# Patient Record
Sex: MALE | Race: OTHER | Employment: FULL TIME | URBAN - METROPOLITAN AREA
[De-identification: names, ages, dates, MRNs, and addresses within clinical notes are randomized per-mention and may not be internally consistent; named-entity substitution may affect disease eponyms.]

---

## 2017-03-17 RX ORDER — POTASSIUM CITRATE 10 MEQ/1
10 TABLET, EXTENDED RELEASE ORAL
Qty: 270 TABLET | Refills: 3 | OUTPATIENT
Start: 2017-03-17

## 2017-03-17 RX ORDER — POTASSIUM CITRATE 10 MEQ/1
TABLET, EXTENDED RELEASE ORAL
Qty: 3240 TABLET | Refills: 3 | Status: CANCELLED | OUTPATIENT
Start: 2017-03-17

## 2017-03-17 RX ORDER — POTASSIUM CITRATE 10 MEQ/1
10 TABLET, EXTENDED RELEASE ORAL
Qty: 270 TABLET | Refills: 3 | Status: SHIPPED | OUTPATIENT
Start: 2017-03-17 | End: 2017-11-07 | Stop reason: CLARIF

## 2017-03-17 RX ORDER — ALLOPURINOL 300 MG/1
300 TABLET ORAL DAILY
Qty: 90 TABLET | Refills: 3 | Status: SHIPPED | OUTPATIENT
Start: 2017-03-17 | End: 2020-12-15

## 2017-03-17 RX ORDER — ALLOPURINOL 300 MG/1
300 TABLET ORAL DAILY
Qty: 90 TABLET | Refills: 3 | OUTPATIENT
Start: 2017-03-17

## 2017-11-01 LAB — PROSTATE SPECIFIC ANTIGEN: 1 NG/ML

## 2017-11-07 ENCOUNTER — OFFICE VISIT (OUTPATIENT)
Dept: UROLOGY | Age: 57
End: 2017-11-07

## 2017-11-07 VITALS
DIASTOLIC BLOOD PRESSURE: 84 MMHG | BODY MASS INDEX: 36.4 KG/M2 | HEART RATE: 70 BPM | WEIGHT: 260 LBS | HEIGHT: 71 IN | SYSTOLIC BLOOD PRESSURE: 138 MMHG

## 2017-11-07 DIAGNOSIS — Z87.442 HISTORY OF KIDNEY STONES: ICD-10-CM

## 2017-11-07 DIAGNOSIS — N40.1 BPH WITH OBSTRUCTION/LOWER URINARY TRACT SYMPTOMS: Primary | ICD-10-CM

## 2017-11-07 DIAGNOSIS — N13.8 BPH WITH OBSTRUCTION/LOWER URINARY TRACT SYMPTOMS: Primary | ICD-10-CM

## 2017-11-07 LAB
BILIRUBIN, POC: NORMAL
BLOOD URINE, POC: NORMAL
CLARITY, POC: CLEAR
COLOR, POC: YELLOW
GLUCOSE URINE, POC: NORMAL
KETONES, POC: NORMAL
LEUKOCYTE EST, POC: NORMAL
NITRITE, POC: NORMAL
PH, POC: 7
PROTEIN, POC: NORMAL
SPECIFIC GRAVITY, POC: 1.02
UROBILINOGEN, POC: 1

## 2017-11-07 PROCEDURE — 51798 US URINE CAPACITY MEASURE: CPT | Performed by: UROLOGY

## 2017-11-07 PROCEDURE — 81003 URINALYSIS AUTO W/O SCOPE: CPT | Performed by: UROLOGY

## 2017-11-07 PROCEDURE — G8484 FLU IMMUNIZE NO ADMIN: HCPCS | Performed by: UROLOGY

## 2017-11-07 PROCEDURE — 99213 OFFICE O/P EST LOW 20 MIN: CPT | Performed by: UROLOGY

## 2017-11-07 PROCEDURE — 3017F COLORECTAL CA SCREEN DOC REV: CPT | Performed by: UROLOGY

## 2017-11-07 PROCEDURE — 51741 ELECTRO-UROFLOWMETRY FIRST: CPT | Performed by: UROLOGY

## 2017-11-07 PROCEDURE — G8427 DOCREV CUR MEDS BY ELIG CLIN: HCPCS | Performed by: UROLOGY

## 2017-11-07 PROCEDURE — 1036F TOBACCO NON-USER: CPT | Performed by: UROLOGY

## 2017-11-07 PROCEDURE — G8417 CALC BMI ABV UP PARAM F/U: HCPCS | Performed by: UROLOGY

## 2017-11-07 RX ORDER — LOSARTAN POTASSIUM 25 MG/1
25 TABLET ORAL DAILY
COMMUNITY

## 2017-11-07 RX ORDER — AMITRIPTYLINE HYDROCHLORIDE 25 MG/1
25 TABLET, FILM COATED ORAL NIGHTLY
COMMUNITY
End: 2020-12-15

## 2017-11-07 ASSESSMENT — ENCOUNTER SYMPTOMS: SHORTNESS OF BREATH: 0

## 2017-11-07 NOTE — PROGRESS NOTES
Subjective:      Patient ID: Yue Mendieta is a 61 y.o. male. HPI This is a 62 yo male with h/o Uric Acid stones s/p ESWL in past (2010), BPH on Flomax and  back in follow-up. Since last seen on 11/3/16, he has intermittent and mild Lt flank pain related to eating meat but none recently. He has no N/V and no hematuria or dysuria. He has stable frequency with NF 2-3 and Df < 8. He has a good fos and no SE from Flomax. He continues on Urocit k 20 meq po BID and Allopurinol daily with SE reported. He has no new medical or surgical problems. I reviewed his interval PSA today. History reviewed. No pertinent past medical history. History reviewed. No pertinent surgical history. Social History     Social History    Marital status:      Spouse name: N/A    Number of children: N/A    Years of education: N/A     Social History Main Topics    Smoking status: Never Smoker    Smokeless tobacco: Never Used    Alcohol use Yes    Drug use: No    Sexual activity: Not Asked     Other Topics Concern    None     Social History Narrative    None     History reviewed. No pertinent family history. Current Outpatient Prescriptions   Medication Sig Dispense Refill    amitriptyline (ELAVIL) 25 MG tablet Take 25 mg by mouth nightly      losartan (COZAAR) 25 MG tablet Take 25 mg by mouth daily      allopurinol (ZYLOPRIM) 300 MG tablet Take 1 tablet by mouth daily 90 tablet 3    tamsulosin (FLOMAX) 0.4 MG capsule TAKE 1 CAPSULE BY MOUTH DAILY 90 capsule 3    metFORMIN (GLUCOPHAGE) 500 MG tablet       atorvastatin (LIPITOR) 10 MG tablet       potassium citrate (UROCIT-K) 10 MEQ (1080 MG) SR tablet Take 1 tablet by mouth 3 times daily 1080 tablet 3     No current facility-administered medications for this visit. Review of patient's allergies indicates no known allergies. reviewed      Review of Systems   Constitutional: Negative for fever and unexpected weight change.    Respiratory: Negative for shortness

## 2017-12-24 DIAGNOSIS — N40.1 BENIGN NON-NODULAR PROSTATIC HYPERPLASIA WITH LOWER URINARY TRACT SYMPTOMS: ICD-10-CM

## 2017-12-29 RX ORDER — TAMSULOSIN HYDROCHLORIDE 0.4 MG/1
CAPSULE ORAL
Qty: 90 CAPSULE | Refills: 3 | Status: SHIPPED | OUTPATIENT
Start: 2017-12-29 | End: 2020-12-15

## 2018-10-30 DIAGNOSIS — N40.1 BPH WITH OBSTRUCTION/LOWER URINARY TRACT SYMPTOMS: ICD-10-CM

## 2018-10-30 DIAGNOSIS — N13.8 BPH WITH OBSTRUCTION/LOWER URINARY TRACT SYMPTOMS: ICD-10-CM

## 2018-10-30 LAB — PROSTATE SPECIFIC ANTIGEN: 1.16 NG/ML (ref 0–5.4)

## 2018-11-06 ENCOUNTER — OFFICE VISIT (OUTPATIENT)
Dept: UROLOGY | Age: 58
End: 2018-11-06
Payer: COMMERCIAL

## 2018-11-06 VITALS
BODY MASS INDEX: 36.12 KG/M2 | WEIGHT: 258 LBS | SYSTOLIC BLOOD PRESSURE: 138 MMHG | DIASTOLIC BLOOD PRESSURE: 82 MMHG | HEIGHT: 71 IN | HEART RATE: 83 BPM

## 2018-11-06 DIAGNOSIS — N13.8 BPH WITH OBSTRUCTION/LOWER URINARY TRACT SYMPTOMS: Primary | ICD-10-CM

## 2018-11-06 DIAGNOSIS — Z87.442 HISTORY OF KIDNEY STONES: ICD-10-CM

## 2018-11-06 DIAGNOSIS — N40.1 BPH WITH OBSTRUCTION/LOWER URINARY TRACT SYMPTOMS: Primary | ICD-10-CM

## 2018-11-06 PROCEDURE — 99213 OFFICE O/P EST LOW 20 MIN: CPT | Performed by: UROLOGY

## 2018-11-06 PROCEDURE — 51798 US URINE CAPACITY MEASURE: CPT | Performed by: UROLOGY

## 2018-11-06 ASSESSMENT — ENCOUNTER SYMPTOMS: SHORTNESS OF BREATH: 0

## 2019-06-24 ENCOUNTER — TELEPHONE (OUTPATIENT)
Dept: UROLOGY | Age: 59
End: 2019-06-24

## 2019-06-24 NOTE — TELEPHONE ENCOUNTER
Pt called c/o pain left side started few days ago. No other symptoms has history of stones. Asked for appt.

## 2019-06-25 ENCOUNTER — OFFICE VISIT (OUTPATIENT)
Dept: UROLOGY | Age: 59
End: 2019-06-25
Payer: COMMERCIAL

## 2019-06-25 VITALS
DIASTOLIC BLOOD PRESSURE: 76 MMHG | HEIGHT: 71 IN | SYSTOLIC BLOOD PRESSURE: 126 MMHG | BODY MASS INDEX: 36.82 KG/M2 | WEIGHT: 263 LBS | HEART RATE: 77 BPM

## 2019-06-25 DIAGNOSIS — M54.9 ACUTE RIGHT-SIDED BACK PAIN, UNSPECIFIED BACK LOCATION: Primary | ICD-10-CM

## 2019-06-25 DIAGNOSIS — Z87.442 HISTORY OF KIDNEY STONES: ICD-10-CM

## 2019-06-25 LAB
BILIRUBIN, POC: ABNORMAL
BLOOD URINE, POC: ABNORMAL
CLARITY, POC: CLEAR
COLOR, POC: YELLOW
GLUCOSE URINE, POC: ABNORMAL
KETONES, POC: ABNORMAL
LEUKOCYTE EST, POC: ABNORMAL
NITRITE, POC: ABNORMAL
PH, POC: 6
PROTEIN, POC: ABNORMAL
SPECIFIC GRAVITY, POC: 1.02
UROBILINOGEN, POC: 1

## 2019-06-25 PROCEDURE — 1036F TOBACCO NON-USER: CPT | Performed by: UROLOGY

## 2019-06-25 PROCEDURE — G8427 DOCREV CUR MEDS BY ELIG CLIN: HCPCS | Performed by: UROLOGY

## 2019-06-25 PROCEDURE — 81003 URINALYSIS AUTO W/O SCOPE: CPT | Performed by: UROLOGY

## 2019-06-25 PROCEDURE — 3017F COLORECTAL CA SCREEN DOC REV: CPT | Performed by: UROLOGY

## 2019-06-25 PROCEDURE — 99213 OFFICE O/P EST LOW 20 MIN: CPT | Performed by: UROLOGY

## 2019-06-25 PROCEDURE — G8417 CALC BMI ABV UP PARAM F/U: HCPCS | Performed by: UROLOGY

## 2019-06-25 ASSESSMENT — ENCOUNTER SYMPTOMS: ABDOMINAL DISTENTION: 0

## 2019-06-25 NOTE — PROGRESS NOTES
Subjective:      Patient ID: Apollo Cohen is a 60 y.o. male. HPI This is a 59 yo male with h/o Uric Acid stones s/p ESWL in past (2010), BPH on Flomax and  back in follow-up. Since last seen on 116/18, he had been doing well until the last few weeks when he has developed Lt flank and abdominal pain that worsens with activity. The pain is not colicky and not similar to prior stone pain. He has no N/V or F/C. He has no dysuria or hematuria or frequency or urgency. He does think he passed a stone 2 weeks ago. He continues on Urocit k 10 meq po BID and Allopurinol daily with SE reported. He has no new medical or surgical problems. I reviewed his interval PSA today.          History reviewed. No pertinent past medical history. Past Surgical History:   Procedure Laterality Date    COLONOSCOPY  2018     Social History     Socioeconomic History    Marital status:      Spouse name: None    Number of children: None    Years of education: None    Highest education level: None   Occupational History    None   Social Needs    Financial resource strain: None    Food insecurity:     Worry: None     Inability: None    Transportation needs:     Medical: None     Non-medical: None   Tobacco Use    Smoking status: Never Smoker    Smokeless tobacco: Never Used   Substance and Sexual Activity    Alcohol use:  Yes    Drug use: No    Sexual activity: None   Lifestyle    Physical activity:     Days per week: None     Minutes per session: None    Stress: None   Relationships    Social connections:     Talks on phone: None     Gets together: None     Attends Jehovah's witness service: None     Active member of club or organization: None     Attends meetings of clubs or organizations: None     Relationship status: None    Intimate partner violence:     Fear of current or ex partner: None     Emotionally abused: None     Physically abused: None     Forced sexual activity: None   Other Topics Concern    None   Social male with h/o Uric Acid stones s/p ESWL in past (2010), BPH on Flomax and with Lt flank/abdominal pain that is not suggestive for colic. He did recently pass a stone and I recommend a Stone Ct for full evaluation. Will call for results. There is no evidence for UTI by U/A today. He will continue with current other management. Plan:      1.  Stone CT and will call for results        Conrado Hyanes MD

## 2019-07-02 ENCOUNTER — HOSPITAL ENCOUNTER (OUTPATIENT)
Dept: CT IMAGING | Age: 59
Discharge: HOME | End: 2019-07-04
Payer: COMMERCIAL

## 2019-07-02 VITALS
DIASTOLIC BLOOD PRESSURE: 95 MMHG | BODY MASS INDEX: 37.65 KG/M2 | HEART RATE: 75 BPM | RESPIRATION RATE: 16 BRPM | WEIGHT: 263 LBS | SYSTOLIC BLOOD PRESSURE: 138 MMHG | HEIGHT: 70 IN

## 2019-07-02 DIAGNOSIS — M54.9 ACUTE RIGHT-SIDED BACK PAIN, UNSPECIFIED BACK LOCATION: ICD-10-CM

## 2019-07-02 PROCEDURE — 74176 CT ABD & PELVIS W/O CONTRAST: CPT

## 2019-07-02 ASSESSMENT — PAIN - FUNCTIONAL ASSESSMENT: PAIN_FUNCTIONAL_ASSESSMENT: 0-10

## 2019-07-08 ENCOUNTER — TELEPHONE (OUTPATIENT)
Dept: UROLOGY | Age: 59
End: 2019-07-08

## 2019-07-08 DIAGNOSIS — N20.0 KIDNEY STONE: Primary | ICD-10-CM

## 2019-07-11 ENCOUNTER — TELEPHONE (OUTPATIENT)
Dept: UROLOGY | Age: 59
End: 2019-07-11

## 2019-07-11 NOTE — TELEPHONE ENCOUNTER
The stones on recent Ct are non-obstructing and not likely the cause for his current complaints alana if pain in legs. F/U as scheduled.

## 2019-07-13 ENCOUNTER — HOSPITAL ENCOUNTER (OUTPATIENT)
Dept: GENERAL RADIOLOGY | Age: 59
Discharge: HOME | End: 2019-07-15
Payer: COMMERCIAL

## 2019-07-13 DIAGNOSIS — N20.0 KIDNEY STONE: ICD-10-CM

## 2019-07-13 LAB — URIC ACID, SERUM: 4.5 MG/DL (ref 3.4–7)

## 2019-07-13 PROCEDURE — 74018 RADEX ABDOMEN 1 VIEW: CPT

## 2019-07-16 ENCOUNTER — OFFICE VISIT (OUTPATIENT)
Dept: UROLOGY | Age: 59
End: 2019-07-16
Payer: COMMERCIAL

## 2019-07-16 VITALS
WEIGHT: 263 LBS | BODY MASS INDEX: 36.82 KG/M2 | SYSTOLIC BLOOD PRESSURE: 142 MMHG | DIASTOLIC BLOOD PRESSURE: 90 MMHG | HEIGHT: 71 IN | HEART RATE: 72 BPM

## 2019-07-16 DIAGNOSIS — N20.0 KIDNEY STONE: Primary | ICD-10-CM

## 2019-07-16 DIAGNOSIS — N20.0 KIDNEY STONE: ICD-10-CM

## 2019-07-16 PROCEDURE — G8427 DOCREV CUR MEDS BY ELIG CLIN: HCPCS | Performed by: UROLOGY

## 2019-07-16 PROCEDURE — 1036F TOBACCO NON-USER: CPT | Performed by: UROLOGY

## 2019-07-16 PROCEDURE — 99213 OFFICE O/P EST LOW 20 MIN: CPT | Performed by: UROLOGY

## 2019-07-16 PROCEDURE — 3017F COLORECTAL CA SCREEN DOC REV: CPT | Performed by: UROLOGY

## 2019-07-16 PROCEDURE — G8417 CALC BMI ABV UP PARAM F/U: HCPCS | Performed by: UROLOGY

## 2019-07-16 RX ORDER — INDOMETHACIN 25 MG/1
25 CAPSULE ORAL 2 TIMES DAILY WITH MEALS
COMMUNITY

## 2019-07-16 RX ORDER — CYCLOBENZAPRINE HCL 10 MG
10 TABLET ORAL NIGHTLY
COMMUNITY

## 2019-07-16 ASSESSMENT — ENCOUNTER SYMPTOMS: ABDOMINAL PAIN: 0

## 2019-07-16 NOTE — PROGRESS NOTES
violence:     Fear of current or ex partner: None     Emotionally abused: None     Physically abused: None     Forced sexual activity: None   Other Topics Concern    None   Social History Narrative    None     History reviewed. No pertinent family history. Current Outpatient Medications   Medication Sig Dispense Refill    cyclobenzaprine (FLEXERIL) 10 MG tablet Take 10 mg by mouth nightly      indomethacin (INDOCIN) 25 MG capsule Take 25 mg by mouth 2 times daily (with meals)      tamsulosin (FLOMAX) 0.4 MG capsule TAKE 1 CAPSULE BY MOUTH DAILY 90 capsule 3    amitriptyline (ELAVIL) 25 MG tablet Take 25 mg by mouth nightly      losartan (COZAAR) 25 MG tablet Take 25 mg by mouth daily      allopurinol (ZYLOPRIM) 300 MG tablet Take 1 tablet by mouth daily 90 tablet 3    metFORMIN (GLUCOPHAGE) 500 MG tablet       atorvastatin (LIPITOR) 10 MG tablet       potassium citrate (UROCIT-K) 10 MEQ (1080 MG) SR tablet Take 1 tablet by mouth 3 times daily 1080 tablet 3     No current facility-administered medications for this visit. Patient has no known allergies. reviewed      Review of Systems   Constitutional: Negative for fever and unexpected weight change. Gastrointestinal: Negative for abdominal pain. Genitourinary: Negative for dysuria, flank pain and hematuria. Objective:   Physical Exam   Constitutional: He appears well-developed. Abdominal: Soft. He exhibits no distension. There is no tenderness. There is no guarding and no CVA tenderness.          Emma Floyd 7/03/2019 11:31 [phone]    Reading Providers     Read Date Phone Pager   Emma Floyd Jul 3, 2019 [phone]    All Reviewers List     Cynthia Lee MD on 7/8/2019 08:45   Routing History     Priority Sent On From To Message Type    7/3/2019 11:34 AM Luis, Chpo Incoming Radiant Results From Yane Romo MD Results   Radiation Dose Estimates     No radiation information found for this

## 2019-07-19 LAB
CALCULI COMPOSITION: NORMAL
MASS: 37 MG
STONE DESCRIPTION: NORMAL
STONE NUMBER: 1
STONE SIZE: NORMAL MM

## 2019-07-24 ENCOUNTER — TELEPHONE (OUTPATIENT)
Dept: UROLOGY | Age: 59
End: 2019-07-24

## 2019-09-30 RX ORDER — TAMSULOSIN HYDROCHLORIDE 0.4 MG/1
0.4 CAPSULE ORAL DAILY
Qty: 90 CAPSULE | Refills: 3 | Status: SHIPPED | OUTPATIENT
Start: 2019-09-30 | End: 2019-11-29 | Stop reason: SDUPTHER

## 2019-09-30 RX ORDER — POTASSIUM CITRATE 10 MEQ/1
TABLET, EXTENDED RELEASE ORAL
Qty: 180 TABLET | Refills: 3 | Status: SHIPPED | OUTPATIENT
Start: 2019-09-30 | End: 2020-12-15 | Stop reason: SDUPTHER

## 2019-09-30 RX ORDER — ALLOPURINOL 300 MG/1
300 TABLET ORAL DAILY
Qty: 90 TABLET | Refills: 3 | Status: SHIPPED | OUTPATIENT
Start: 2019-09-30 | End: 2019-11-29 | Stop reason: SDUPTHER

## 2019-10-22 ENCOUNTER — TELEPHONE (OUTPATIENT)
Dept: UROLOGY | Age: 59
End: 2019-10-22

## 2019-10-22 DIAGNOSIS — M54.50 BILATERAL LOW BACK PAIN, UNSPECIFIED CHRONICITY, UNSPECIFIED WHETHER SCIATICA PRESENT: Primary | ICD-10-CM

## 2019-10-31 ENCOUNTER — TELEPHONE (OUTPATIENT)
Dept: UROLOGY | Age: 59
End: 2019-10-31

## 2019-11-11 ENCOUNTER — TELEPHONE (OUTPATIENT)
Dept: UROLOGY | Age: 59
End: 2019-11-11

## 2019-11-11 DIAGNOSIS — N40.0 BPH WITHOUT OBSTRUCTION/LOWER URINARY TRACT SYMPTOMS: Primary | ICD-10-CM

## 2019-11-16 DIAGNOSIS — N40.0 BPH WITHOUT OBSTRUCTION/LOWER URINARY TRACT SYMPTOMS: ICD-10-CM

## 2019-11-16 LAB — PROSTATE SPECIFIC ANTIGEN: 1.21 NG/ML (ref 0–5.4)

## 2019-11-29 ENCOUNTER — TELEPHONE (OUTPATIENT)
Dept: UROLOGY | Age: 59
End: 2019-11-29

## 2019-11-29 ENCOUNTER — OFFICE VISIT (OUTPATIENT)
Dept: UROLOGY | Age: 59
End: 2019-11-29
Payer: COMMERCIAL

## 2019-11-29 VITALS
BODY MASS INDEX: 36.4 KG/M2 | SYSTOLIC BLOOD PRESSURE: 138 MMHG | HEIGHT: 71 IN | DIASTOLIC BLOOD PRESSURE: 86 MMHG | HEART RATE: 71 BPM | WEIGHT: 260 LBS

## 2019-11-29 DIAGNOSIS — N20.0 KIDNEY STONES: ICD-10-CM

## 2019-11-29 DIAGNOSIS — N40.1 BPH WITH OBSTRUCTION/LOWER URINARY TRACT SYMPTOMS: Primary | ICD-10-CM

## 2019-11-29 DIAGNOSIS — N13.8 BPH WITH OBSTRUCTION/LOWER URINARY TRACT SYMPTOMS: Primary | ICD-10-CM

## 2019-11-29 LAB
BILIRUBIN, POC: ABNORMAL
BLOOD URINE, POC: ABNORMAL
CLARITY, POC: CLEAR
COLOR, POC: YELLOW
GLUCOSE URINE, POC: ABNORMAL
KETONES, POC: ABNORMAL
LEUKOCYTE EST, POC: ABNORMAL
NITRITE, POC: ABNORMAL
PH, POC: 5
PROTEIN, POC: ABNORMAL
SPECIFIC GRAVITY, POC: 1.02
UROBILINOGEN, POC: 0.2

## 2019-11-29 PROCEDURE — 1036F TOBACCO NON-USER: CPT | Performed by: UROLOGY

## 2019-11-29 PROCEDURE — 81003 URINALYSIS AUTO W/O SCOPE: CPT | Performed by: UROLOGY

## 2019-11-29 PROCEDURE — 99213 OFFICE O/P EST LOW 20 MIN: CPT | Performed by: UROLOGY

## 2019-11-29 PROCEDURE — G8484 FLU IMMUNIZE NO ADMIN: HCPCS | Performed by: UROLOGY

## 2019-11-29 PROCEDURE — 3017F COLORECTAL CA SCREEN DOC REV: CPT | Performed by: UROLOGY

## 2019-11-29 PROCEDURE — G8427 DOCREV CUR MEDS BY ELIG CLIN: HCPCS | Performed by: UROLOGY

## 2019-11-29 PROCEDURE — G8417 CALC BMI ABV UP PARAM F/U: HCPCS | Performed by: UROLOGY

## 2019-11-29 RX ORDER — ALLOPURINOL 300 MG/1
300 TABLET ORAL DAILY
Qty: 90 TABLET | Refills: 3 | Status: SHIPPED | OUTPATIENT
Start: 2019-11-29 | End: 2020-11-23

## 2019-11-29 RX ORDER — POTASSIUM CITRATE 10 MEQ/1
10 TABLET, EXTENDED RELEASE ORAL
Qty: 270 TABLET | Refills: 3 | Status: SHIPPED | OUTPATIENT
Start: 2019-11-29 | End: 2020-12-15

## 2019-11-29 RX ORDER — TAMSULOSIN HYDROCHLORIDE 0.4 MG/1
0.4 CAPSULE ORAL DAILY
Qty: 90 CAPSULE | Refills: 3 | Status: SHIPPED | OUTPATIENT
Start: 2019-11-29 | End: 2020-11-23

## 2019-11-29 ASSESSMENT — ENCOUNTER SYMPTOMS
ABDOMINAL PAIN: 0
BACK PAIN: 1

## 2020-11-23 RX ORDER — TAMSULOSIN HYDROCHLORIDE 0.4 MG/1
CAPSULE ORAL
Qty: 90 CAPSULE | Refills: 3 | Status: SHIPPED | OUTPATIENT
Start: 2020-11-23

## 2020-11-23 RX ORDER — ALLOPURINOL 300 MG/1
TABLET ORAL
Qty: 90 TABLET | Refills: 3 | Status: SHIPPED | OUTPATIENT
Start: 2020-11-23 | End: 2020-12-15 | Stop reason: SDUPTHER

## 2020-12-07 ENCOUNTER — TELEPHONE (OUTPATIENT)
Dept: UROLOGY | Age: 60
End: 2020-12-07

## 2020-12-07 LAB — PROSTATE SPECIFIC ANTIGEN: 1.02 NG/ML (ref 0–5.4)

## 2020-12-15 ENCOUNTER — OFFICE VISIT (OUTPATIENT)
Dept: UROLOGY | Age: 60
End: 2020-12-15
Payer: COMMERCIAL

## 2020-12-15 VITALS
DIASTOLIC BLOOD PRESSURE: 82 MMHG | WEIGHT: 255.6 LBS | SYSTOLIC BLOOD PRESSURE: 138 MMHG | HEIGHT: 70 IN | BODY MASS INDEX: 36.59 KG/M2 | HEART RATE: 58 BPM

## 2020-12-15 PROCEDURE — G8484 FLU IMMUNIZE NO ADMIN: HCPCS | Performed by: UROLOGY

## 2020-12-15 PROCEDURE — G8417 CALC BMI ABV UP PARAM F/U: HCPCS | Performed by: UROLOGY

## 2020-12-15 PROCEDURE — 1036F TOBACCO NON-USER: CPT | Performed by: UROLOGY

## 2020-12-15 PROCEDURE — G8427 DOCREV CUR MEDS BY ELIG CLIN: HCPCS | Performed by: UROLOGY

## 2020-12-15 PROCEDURE — 3017F COLORECTAL CA SCREEN DOC REV: CPT | Performed by: UROLOGY

## 2020-12-15 PROCEDURE — 99213 OFFICE O/P EST LOW 20 MIN: CPT | Performed by: UROLOGY

## 2020-12-15 RX ORDER — OXYBUTYNIN CHLORIDE 10 MG/1
TABLET, EXTENDED RELEASE ORAL
COMMUNITY
Start: 2020-11-16

## 2020-12-15 RX ORDER — POTASSIUM CITRATE 10 MEQ/1
TABLET, EXTENDED RELEASE ORAL
Qty: 180 TABLET | Refills: 3 | Status: SHIPPED | OUTPATIENT
Start: 2020-12-15 | End: 2022-01-12

## 2020-12-15 RX ORDER — ERGOCALCIFEROL (VITAMIN D2) 1250 MCG
50000 CAPSULE ORAL WEEKLY
COMMUNITY
Start: 2020-04-27 | End: 2021-05-25

## 2020-12-15 RX ORDER — ALLOPURINOL 300 MG/1
300 TABLET ORAL DAILY
Qty: 90 TABLET | Refills: 3 | Status: SHIPPED | OUTPATIENT
Start: 2020-12-15

## 2020-12-15 ASSESSMENT — ENCOUNTER SYMPTOMS
ABDOMINAL DISTENTION: 0
ABDOMINAL PAIN: 0

## 2020-12-15 NOTE — PROGRESS NOTES
Subjective:      Patient ID: Elisabeth Barton is a 63 y.o. male. HPI  This is a 62 yo male with h/o Uric Acid stones s/p ESWL in past (2010), BPH on Flomax and back in follow-up. Since last seen on 11/29/19, he has no new complaints and still passes occasional small stones without problems. He has no dysuria or hematuria and no interval voiding complaints. He continues on Urocit k 10 meq po BID and tries to take TID and Allopurinol daily with no SE reported. He has no new medical or surgical problems. I reviewed his interval PSA today. History reviewed. No pertinent past medical history. Past Surgical History:   Procedure Laterality Date    COLONOSCOPY  2018    VEIN SURGERY       Social History     Socioeconomic History    Marital status:      Spouse name: None    Number of children: None    Years of education: None    Highest education level: None   Occupational History    None   Social Needs    Financial resource strain: None    Food insecurity     Worry: None     Inability: None    Transportation needs     Medical: None     Non-medical: None   Tobacco Use    Smoking status: Never Smoker    Smokeless tobacco: Never Used   Substance and Sexual Activity    Alcohol use: Yes    Drug use: No    Sexual activity: None   Lifestyle    Physical activity     Days per week: None     Minutes per session: None    Stress: None   Relationships    Social connections     Talks on phone: None     Gets together: None     Attends Denominational service: None     Active member of club or organization: None     Attends meetings of clubs or organizations: None     Relationship status: None    Intimate partner violence     Fear of current or ex partner: None     Emotionally abused: None     Physically abused: None     Forced sexual activity: None   Other Topics Concern    None   Social History Narrative    None     History reviewed. No pertinent family history.   Current Outpatient Medications Medication Sig Dispense Refill    ergocalciferol (ERGOCALCIFEROL) 1.25 MG (58351 UT) capsule Take 50,000 Units by mouth once a week      oxybutynin (DITROPAN-XL) 10 MG extended release tablet TAKE ONE TABLET BY MOUTH ONCE DAILY      tamsulosin (FLOMAX) 0.4 MG capsule TAKE ONE CAPSULE BY MOUTH DAILY 90 capsule 3    allopurinol (ZYLOPRIM) 300 MG tablet TAKE ONE TABLET BY MOUTH DAILY 90 tablet 3    potassium citrate (UROCIT-K) 10 MEQ (1080 MG) extended release tablet Take 1 tablet 2 times a day 180 tablet 3    indomethacin (INDOCIN) 25 MG capsule Take 25 mg by mouth 2 times daily (with meals)      losartan (COZAAR) 25 MG tablet Take 25 mg by mouth daily      metFORMIN (GLUCOPHAGE) 500 MG tablet       atorvastatin (LIPITOR) 10 MG tablet       cyclobenzaprine (FLEXERIL) 10 MG tablet Take 10 mg by mouth nightly       No current facility-administered medications for this visit. Patient has no known allergies. reviewed    Review of Systems   Gastrointestinal: Negative for abdominal distention and abdominal pain. Genitourinary: Negative for difficulty urinating, dysuria, flank pain and hematuria. Objective:   Physical Exam  Constitutional:       Appearance: Normal appearance. Genitourinary:     Prostate: Enlarged. Not tender and no nodules present. Rectum: No external hemorrhoid. Neurological:      Mental Status: He is alert. PSA   Date Value Ref Range Status   12/07/2020 1.02 0.00 - 5.40 ng/mL Final   11/16/2019 1.21 0.00 - 5.40 ng/mL Final   10/30/2018 1.16 0.00 - 5.40 ng/mL Final   11/01/2017 1.0 ng/mL Final   11/02/2016 1.13 0.00 - 3.89 ng/mL Corrected       Assessment: This is a 60 yo male with h/o Uric Acid stones s/p ESWL in past (2010), BPH on Flomax (stable) and will continue with other present management and increase Urocit to TID. He has a normal PSA. Plan:      1. F/U 1 yr for PSA  2.    Orders Placed This Encounter   Medications  allopurinol (ZYLOPRIM) 300 MG tablet     Sig: Take 1 tablet by mouth daily     Dispense:  90 tablet     Refill:  3    potassium citrate (UROCIT-K) 10 MEQ (1080 MG) extended release tablet     Sig: Take 1 tablet 2 times a day     Dispense:  180 tablet     Refill:  3             Howie Vogel MD

## 2021-05-25 ENCOUNTER — OFFICE VISIT (OUTPATIENT)
Dept: UROLOGY | Age: 61
End: 2021-05-25
Payer: COMMERCIAL

## 2021-05-25 VITALS
SYSTOLIC BLOOD PRESSURE: 118 MMHG | DIASTOLIC BLOOD PRESSURE: 82 MMHG | HEIGHT: 71 IN | BODY MASS INDEX: 34.02 KG/M2 | HEART RATE: 77 BPM | WEIGHT: 243 LBS

## 2021-05-25 DIAGNOSIS — N28.1 RENAL CYST, ACQUIRED, RIGHT: Primary | ICD-10-CM

## 2021-05-25 PROCEDURE — G8417 CALC BMI ABV UP PARAM F/U: HCPCS | Performed by: UROLOGY

## 2021-05-25 PROCEDURE — 1036F TOBACCO NON-USER: CPT | Performed by: UROLOGY

## 2021-05-25 PROCEDURE — G8427 DOCREV CUR MEDS BY ELIG CLIN: HCPCS | Performed by: UROLOGY

## 2021-05-25 PROCEDURE — 99213 OFFICE O/P EST LOW 20 MIN: CPT | Performed by: UROLOGY

## 2021-05-25 PROCEDURE — 3017F COLORECTAL CA SCREEN DOC REV: CPT | Performed by: UROLOGY

## 2021-05-25 ASSESSMENT — ENCOUNTER SYMPTOMS
ABDOMINAL DISTENTION: 0
ABDOMINAL PAIN: 0
SHORTNESS OF BREATH: 0

## 2021-05-25 NOTE — PROGRESS NOTES
Subjective:      Patient ID: Hiram Romero is a 57 y.o. male. HPI  This is a 61 yo male with h/o Uric Acid stones s/p ESWL in past (2010), BPH on Flomax and back in follow-up. Since last seen on 12/15/20, he has no new  complaints and still passes occasional small stones without problems. He has no dysuria or hematuria and no interval voiding complaints. He was having some Rt hip pain and had a CT with IV contrast that showed a 9 mm indeterminate renal lesion and MRI was recommended. He continues on Urocit k 10 meq po BID and tries to take TID and Allopurinol daily with no SE reported. He has no new medical or surgical problems. I reviewed his interval PSA today. The hip pain has improved with rest and heat. CT with IV contrast at [facility] 5/17/2: bilateral renal cysts and non-obstructing stones and 9 mm Rt hyperdense lesion in kidney    History reviewed. No pertinent past medical history. Past Surgical History:   Procedure Laterality Date    COLONOSCOPY  2018    VEIN SURGERY       Social History     Socioeconomic History    Marital status:      Spouse name: None    Number of children: None    Years of education: None    Highest education level: None   Occupational History    None   Tobacco Use    Smoking status: Never Smoker    Smokeless tobacco: Never Used   Substance and Sexual Activity    Alcohol use: Yes    Drug use: No    Sexual activity: None   Other Topics Concern    None   Social History Narrative    None     Social Determinants of Health     Financial Resource Strain:     Difficulty of Paying Living Expenses:    Food Insecurity:     Worried About Running Out of Food in the Last Year:     [address] in the Last Year:    Transportation Needs:     Lack of Transportation (Medical):      Lack of Transportation (Non-Medical):    Physical Activity:     Days of Exercise per Week:     Minutes of Exercise per Session:    Stress:     Feeling of Stress :    Social Connections:  Frequency of Communication with Friends and Family:     Frequency of Social Gatherings with Friends and Family:     Attends Che[employer] Services:     Active Member of Clubs or Organizations:     Attends Club or Organization Meetings:     Marital Status:    Intimate Partner Violence:     Fear of Current or Ex-Partner:     Emotionally Abused:     Physically Abused:     Sexually Abused:      History reviewed. No pertinent family history. Current Outpatient Medications   Medication Sig Dispense Refill    oxybutynin (DITROPAN-XL) 10 MG extended release tablet TAKE ONE TABLET BY MOUTH ONCE DAILY      allopurinol (ZYLOPRIM) 300 MG tablet Take 1 tablet by mouth daily 90 tablet 3    potassium citrate (UROCIT-K) 10 MEQ (1080 MG) extended release tablet Take 1 tablet 2 times a day 180 tablet 3    tamsulosin (FLOMAX) 0.4 MG capsule TAKE ONE CAPSULE BY MOUTH DAILY 90 capsule 3    cyclobenzaprine (FLEXERIL) 10 MG tablet Take 10 mg by mouth nightly      indomethacin (INDOCIN) 25 MG capsule Take 25 mg by mouth 2 times daily (with meals)      losartan (COZAAR) 25 MG tablet Take 25 mg by mouth daily      metFORMIN (GLUCOPHAGE) 500 MG tablet       atorvastatin (LIPITOR) 10 MG tablet        No current facility-administered medications for this visit. Patient has no known allergies. reviewed    Review of Systems   Constitutional: Negative for unexpected weight change. Respiratory: Negative for shortness of breath. Cardiovascular: Negative for chest pain. Gastrointestinal: Negative for abdominal distention and abdominal pain. Genitourinary: Negative for difficulty urinating, dysuria, flank pain and hematuria. Objective:   Physical Exam  Constitutional:       Appearance: Normal appearance. Abdominal:      General: There is no distension. Palpations: Abdomen is soft. Tenderness: There is no abdominal tenderness. There is no right CVA tenderness, left CVA tenderness or guarding. Neurological:      Mental Status: He is alert. Psychiatric:         Mood and Affect: Mood normal.         Assessment: This is a 59 yo male with h/o Uric Acid stones s/p ESWL in past (2010), BPH on Flomax and with small incidentally noted Rt renal lesion by CT with IV contrast. I recommend an MRI for full evaluation and he agrees. Plan:      1.  F/U 1-2 weeks with [facility] for MRI kidneys        Adelfo Brannon MD

## 2021-06-11 ENCOUNTER — HOSPITAL ENCOUNTER (OUTPATIENT)
Dept: MRI IMAGING | Age: 61
Discharge: HOME | End: 2021-06-13
Payer: COMMERCIAL

## 2021-06-11 DIAGNOSIS — N28.1 RENAL CYST, ACQUIRED, RIGHT: ICD-10-CM

## 2021-06-11 PROCEDURE — 74183 MRI ABD W/O CNTR FLWD CNTR: CPT

## 2021-06-11 PROCEDURE — A9577 INJ MULTIHANCE: HCPCS | Performed by: RADIOLOGY

## 2021-06-11 PROCEDURE — 6360000004 HC RX CONTRAST MEDICATION: Performed by: RADIOLOGY

## 2021-06-11 RX ORDER — SODIUM CHLORIDE 0.9 % (FLUSH) 0.9 %
40 SYRINGE (ML) INJECTION ONCE
Status: DISCONTINUED | OUTPATIENT
Start: 2021-06-11 | End: 2021-06-14 | Stop reason: HOSPADM

## 2021-06-11 RX ADMIN — GADOBENATE DIMEGLUMINE 20 ML: 529 INJECTION, SOLUTION INTRAVENOUS at 08:39

## 2021-06-18 ENCOUNTER — VIRTUAL VISIT (OUTPATIENT)
Dept: UROLOGY | Age: 61
End: 2021-06-18
Payer: COMMERCIAL

## 2021-06-18 DIAGNOSIS — N40.1 BPH WITH OBSTRUCTION/LOWER URINARY TRACT SYMPTOMS: Primary | ICD-10-CM

## 2021-06-18 DIAGNOSIS — N13.8 BPH WITH OBSTRUCTION/LOWER URINARY TRACT SYMPTOMS: Primary | ICD-10-CM

## 2021-06-18 DIAGNOSIS — N28.1 RENAL CYSTS, ACQUIRED, BILATERAL: ICD-10-CM

## 2021-06-18 PROCEDURE — 99441 PR PHYS/QHP TELEPHONE EVALUATION 5-10 MIN: CPT | Performed by: UROLOGY

## 2021-06-18 ASSESSMENT — ENCOUNTER SYMPTOMS
ABDOMINAL DISTENTION: 0
ABDOMINAL PAIN: 0

## 2021-06-18 NOTE — PROGRESS NOTES
Subjective:      Patient ID: Joni Curiel is a 57 y.o. male. HPI This is a 61 yo male with h/o Uric Acid stones s/p ESWL in past (2010), BPH on Flomax and back in follow-up. Since last seen on 5/25/21 for new finding of renal cysts, he has no dysuria or hematuria and no interval voiding complaints. He was having some Rt hip pain and had a CT with IV contrast that showed a 9 mm indeterminate renal lesion and MRI was recommended. He continues on Urocit k 10 meq po BID and tries to take TID and Allopurinol daily with no SE reported. He has no new medical or surgical problems. I reviewed the interval MRI report in detail. The patient was notified that this is a billable service and has given verbal consent for telehealth services. The visit was conducted via phone in my office. The time spent was 7 minutes.     CT with IV contrast at [facility] 5/17/2: bilateral renal cysts and non-obstructing stones and 9 mm Rt hyperdense lesion in kidney    History reviewed. No pertinent past medical history. Past Surgical History:   Procedure Laterality Date    COLONOSCOPY  2018    VEIN SURGERY       Social History     Socioeconomic History    Marital status:      Spouse name: None    Number of children: None    Years of education: None    Highest education level: None   Occupational History    None   Tobacco Use    Smoking status: Never Smoker    Smokeless tobacco: Never Used   Substance and Sexual Activity    Alcohol use: Yes    Drug use: No    Sexual activity: None   Other Topics Concern    None   Social History Narrative    None     Social Determinants of Health     Financial Resource Strain:     Difficulty of Paying Living Expenses:    Food Insecurity:     Worried About Running Out of Food in the Last Year:     [address] in the Last Year:    Transportation Needs:     Lack of Transportation (Medical):      Lack of Transportation (Non-Medical):    Physical Activity:     Days of Exercise per Week:     Read Date Phone Pager   Pastor Mundo Victoria Jun 11, 2021 12:04 [phone]    All Reviewers List    Kimmie Viera MD on 6/11/2021 12:15   Routing History    Priority Sent On From To Message Type    6/11/2021 12:07 PM Luis, Chpo Incoming Radiant Results From Eduar Slaughter MD Results   Orders Requiring a Screening Form    Procedure Order Status Form Status     Active     MRI ABDOMEN W WO CONTRAST Completed Created   Radiation Dose Estimates    No radiation information found for this patient   Narrative   EXAMINATION: MRI ABDOMEN W WO CONTRAST       DATE AND TIME:6/11/2021 7:15 AM       CLINICAL HISTORY: N28.1 Renal cyst, acquired, right ICD10        COMPARISON: None       TECHNIQUE:Multiplanar, multi-pulse sequence MRI of the abdomen without intravenous contrast).  Pulse sequences included but not limited to: axial in- and out of phase, coronal 2-D FIESTA, coronal fat-saturation T2, axial T2 without and with    fat-saturation, axial fat-suppressed T1. 20 cc of gadolinium contrast administered intravenously.       FINDINGS:      Kidneys: There is a 1 cm lesion in the midpole the right kidney medially that is homogeneously hyperintense on T1. This shows no contrast opacification. This corresponds to a hyperdense cyst seen on previous CT scans of the abdomen and    pelvis dated July 2, 2019 and May 6, 2014. This is consistent with a Bosniak 2 cystic renal mass requiring no follow-up. Additional bilateral Bosniak 1 simple renal cysts as previously described on CT are also noted. No suspicious renal mass. No    hydronephrosis.  Punctate bilateral renal calculi seen on the CT study are not clearly identified on these MRI images.            Liver:  The liver has normal contour, morphology and signal intensity, with no focal mass on these noncontrast images.                                             Gallbladder:  There are no filling defects in the gallbladder to suggest gallstones. Bridgette Bianchi is no MR evidence of acute cholecystitis. Michelle Pilon is no biliary ductal dilation.            Pancreas: The pancreas has normal ignal intensity with no mass or ductal dilatation.            Spleen: Normal            Adrenals: Normal                    Bowel: The included small bowel and colonic loops are all normal in caliber and wall thickness.  No evidence of small bowel obstruction.            Other findings: There is no abdominal adenopathy, including imaged retroperitoneum.  No free fluid or fluid collection.  Normal caliber aorta.               Impression   1 CM RIGHT RENAL LESION IDENTIFIED ON RECENT CT STUDY FROM THE [facility] REPRESENTS A BOSNIAK 2 RENAL CYST REQUIRING NO FOLLOW-UP. THIS IS UNCHANGED FROM 2014 . ADDITIONAL BILATERAL BENIGN SIMPLE RENAL CYST REQUIRING NO FOLLOW-UP ALSO    NOTED       Assessment: This is a 61 yo male with h/o Uric Acid stones s/p ESWL in past (), BPH on Flomax and back in follow-up. Since last seen on 21 for new finding of renal cysts most of which are simple and has one Bosniak 2 cyst in the Rt kidney that is without change over 7 yrs. I reassured him of these findings and recommend no further follow-up on the cysts. He will continue with present management for his stones and F/U as scheduled. Plan:      1.  F/U as scheduled for DUYEN Couch MD

## 2021-12-18 DIAGNOSIS — N13.8 BPH WITH OBSTRUCTION/LOWER URINARY TRACT SYMPTOMS: ICD-10-CM

## 2021-12-18 DIAGNOSIS — N40.1 BPH WITH OBSTRUCTION/LOWER URINARY TRACT SYMPTOMS: ICD-10-CM

## 2021-12-18 LAB — PROSTATE SPECIFIC ANTIGEN: 1 NG/ML (ref 0–4)

## 2021-12-21 ENCOUNTER — OFFICE VISIT (OUTPATIENT)
Dept: UROLOGY | Age: 61
End: 2021-12-21
Payer: COMMERCIAL

## 2021-12-21 VITALS
SYSTOLIC BLOOD PRESSURE: 120 MMHG | HEART RATE: 82 BPM | HEIGHT: 71 IN | BODY MASS INDEX: 34.72 KG/M2 | DIASTOLIC BLOOD PRESSURE: 70 MMHG | WEIGHT: 248 LBS | OXYGEN SATURATION: 95 %

## 2021-12-21 DIAGNOSIS — N13.8 BPH WITH OBSTRUCTION/LOWER URINARY TRACT SYMPTOMS: Primary | ICD-10-CM

## 2021-12-21 DIAGNOSIS — N52.9 ERECTILE DYSFUNCTION, UNSPECIFIED ERECTILE DYSFUNCTION TYPE: ICD-10-CM

## 2021-12-21 DIAGNOSIS — N40.1 BPH WITH OBSTRUCTION/LOWER URINARY TRACT SYMPTOMS: Primary | ICD-10-CM

## 2021-12-21 DIAGNOSIS — Z87.442 HISTORY OF KIDNEY STONES: ICD-10-CM

## 2021-12-21 PROCEDURE — 3017F COLORECTAL CA SCREEN DOC REV: CPT | Performed by: UROLOGY

## 2021-12-21 PROCEDURE — G8484 FLU IMMUNIZE NO ADMIN: HCPCS | Performed by: UROLOGY

## 2021-12-21 PROCEDURE — 1036F TOBACCO NON-USER: CPT | Performed by: UROLOGY

## 2021-12-21 PROCEDURE — 99214 OFFICE O/P EST MOD 30 MIN: CPT | Performed by: UROLOGY

## 2021-12-21 PROCEDURE — G8427 DOCREV CUR MEDS BY ELIG CLIN: HCPCS | Performed by: UROLOGY

## 2021-12-21 PROCEDURE — G8417 CALC BMI ABV UP PARAM F/U: HCPCS | Performed by: UROLOGY

## 2021-12-21 RX ORDER — B-COMPLEX WITH VITAMIN C
1 TABLET ORAL DAILY
COMMUNITY
Start: 2021-07-08

## 2021-12-21 RX ORDER — OXYBUTYNIN CHLORIDE 10 MG/1
10 TABLET, EXTENDED RELEASE ORAL DAILY
Qty: 90 TABLET | Refills: 3 | Status: SHIPPED | OUTPATIENT
Start: 2021-12-21

## 2021-12-21 RX ORDER — ASPIRIN 81 MG/1
81 TABLET, CHEWABLE ORAL DAILY
COMMUNITY
Start: 2021-07-08

## 2021-12-21 ASSESSMENT — ENCOUNTER SYMPTOMS
ABDOMINAL DISTENTION: 0
ABDOMINAL PAIN: 0
SHORTNESS OF BREATH: 0

## 2021-12-21 NOTE — PROGRESS NOTES
Subjective:      Patient ID: Isamar Henriquez is a 57 y.o. male    HPI  This is a 59 yo male with h/o Uric Acid stones s/p ESWL in past (2010), BPH on Flomax/Ditropan and back in follow-up. Since last seen on 5/25/21 for a stable and likely benign renal cysts, he has no new  complaints. He continues on Urocit k 10 meq po BID and tries to take TID and Allopurinol daily with no SE reported. He has no new medical or surgical problems. I reviewed the interval PSA results and he reports having problems with ED and tried Viagra but was not very effective and gave him bothersome SE. he bae snot want to try any other medications.      CT with IV contrast at [facility] 5/17/2: bilateral renal cysts and non-obstructing stones and 9 mm Rt hyperdense lesion in kidney    History reviewed. No pertinent past medical history. Past Surgical History:   Procedure Laterality Date    COLONOSCOPY  2018    VEIN SURGERY       Social History     Socioeconomic History    Marital status:      Spouse name: None    Number of children: None    Years of education: None    Highest education level: None   Occupational History    None   Tobacco Use    Smoking status: Never Smoker    Smokeless tobacco: Never Used   Substance and Sexual Activity    Alcohol use: Yes    Drug use: No    Sexual activity: None   Other Topics Concern    None   Social History Narrative    None     Social Determinants of Health     Financial Resource Strain:     Difficulty of Paying Living Expenses: Not on file   Food Insecurity:     Worried About Running Out of Food in the Last Year: Not on file    Marylin of Food in the Last Year: Not on file   Transportation Needs:     Lack of Transportation (Medical): Not on file    Lack of Transportation (Non-Medical):  Not on file   Physical Activity:     Days of Exercise per Week: Not on file    Minutes of Exercise per Session: Not on file   Stress:     Feeling of Stress : Not on file   Social Connections:     Frequency of Communication with Friends and Family: Not on file    Frequency of Social Gatherings with Friends and Family: Not on file    Attends Oriental orthodox Services: Not on file    Active Member of Clubs or Organizations: Not on file    Attends Club or Organization Meetings: Not on file    Marital Status: Not on file   Intimate Partner Violence:     Fear of Current or Ex-Partner: Not on file    Emotionally Abused: Not on file    Physically Abused: Not on file    Sexually Abused: Not on file   Housing Stability:     Unable to Pay for Housing in the Last Year: Not on file    Number of [place] in the Last Year: Not on file    Unstable Housing in the Last Year: Not on file     History reviewed. No pertinent family history.   Current Outpatient Medications   Medication Sig Dispense Refill    oxybutynin (DITROPAN XL) 10 MG extended release tablet Take 1 tablet by mouth daily 90 tablet 3    B Complex Vitamins (VITAMIN B COMPLEX) TABS Take 1 tablet by mouth daily      aspirin 81 MG chewable tablet Take 81 mg by mouth daily      metFORMIN (GLUCOPHAGE) 1000 MG tablet TAKE ONE TABLET BY MOUTH TWO TIMES A DAY      allopurinol (ZYLOPRIM) 300 MG tablet Take 1 tablet by mouth daily 90 tablet 3    potassium citrate (UROCIT-K) 10 MEQ (1080 MG) extended release tablet Take 1 tablet 2 times a day 180 tablet 3    tamsulosin (FLOMAX) 0.4 MG capsule TAKE ONE CAPSULE BY MOUTH DAILY 90 capsule 3    losartan (COZAAR) 25 MG tablet Take 25 mg by mouth daily      atorvastatin (LIPITOR) 10 MG tablet       oxybutynin (DITROPAN-XL) 10 MG extended release tablet TAKE ONE TABLET BY MOUTH ONCE DAILY (Patient not taking: Reported on 12/21/2021)      cyclobenzaprine (FLEXERIL) 10 MG tablet Take 10 mg by mouth nightly (Patient not taking: Reported on 12/21/2021)      indomethacin (INDOCIN) 25 MG capsule Take 25 mg by mouth 2 times daily (with meals) (Patient not taking: Reported on 12/21/2021)       No current

## 2022-01-12 RX ORDER — POTASSIUM CITRATE 10 MEQ/1
TABLET, EXTENDED RELEASE ORAL
Qty: 180 TABLET | Refills: 3 | Status: SHIPPED | OUTPATIENT
Start: 2022-01-12

## 2022-12-27 DIAGNOSIS — N40.1 BPH WITH OBSTRUCTION/LOWER URINARY TRACT SYMPTOMS: Primary | ICD-10-CM

## 2022-12-27 DIAGNOSIS — N13.8 BPH WITH OBSTRUCTION/LOWER URINARY TRACT SYMPTOMS: Primary | ICD-10-CM

## 2023-01-28 DIAGNOSIS — N13.8 BPH WITH OBSTRUCTION/LOWER URINARY TRACT SYMPTOMS: ICD-10-CM

## 2023-01-28 DIAGNOSIS — N40.1 BPH WITH OBSTRUCTION/LOWER URINARY TRACT SYMPTOMS: ICD-10-CM

## 2023-01-28 LAB — PROSTATE SPECIFIC ANTIGEN: 1.39 NG/ML (ref 0–4)

## 2023-01-30 ENCOUNTER — OFFICE VISIT (OUTPATIENT)
Dept: UROLOGY | Age: 63
End: 2023-01-30
Payer: COMMERCIAL

## 2023-01-30 VITALS
WEIGHT: 247 LBS | HEIGHT: 70 IN | BODY MASS INDEX: 35.36 KG/M2 | HEART RATE: 64 BPM | SYSTOLIC BLOOD PRESSURE: 132 MMHG | DIASTOLIC BLOOD PRESSURE: 84 MMHG

## 2023-01-30 DIAGNOSIS — N13.8 BPH WITH OBSTRUCTION/LOWER URINARY TRACT SYMPTOMS: Primary | ICD-10-CM

## 2023-01-30 DIAGNOSIS — Z87.442 HISTORY OF KIDNEY STONES: ICD-10-CM

## 2023-01-30 DIAGNOSIS — N40.1 BPH WITH OBSTRUCTION/LOWER URINARY TRACT SYMPTOMS: Primary | ICD-10-CM

## 2023-01-30 PROCEDURE — 99213 OFFICE O/P EST LOW 20 MIN: CPT | Performed by: UROLOGY

## 2023-01-30 RX ORDER — ALLOPURINOL 300 MG/1
300 TABLET ORAL DAILY
Qty: 90 TABLET | Refills: 3 | Status: SHIPPED | OUTPATIENT
Start: 2023-01-30

## 2023-01-30 RX ORDER — POTASSIUM CITRATE 10 MEQ/1
TABLET, EXTENDED RELEASE ORAL
Qty: 180 TABLET | Refills: 3 | Status: SHIPPED | OUTPATIENT
Start: 2023-01-30

## 2023-01-30 RX ORDER — OXYBUTYNIN CHLORIDE 10 MG/1
10 TABLET, EXTENDED RELEASE ORAL DAILY
Qty: 90 TABLET | Refills: 3 | Status: SHIPPED | OUTPATIENT
Start: 2023-01-30

## 2023-01-30 RX ORDER — TAMSULOSIN HYDROCHLORIDE 0.4 MG/1
0.4 CAPSULE ORAL DAILY
Qty: 90 CAPSULE | Refills: 3 | Status: SHIPPED | OUTPATIENT
Start: 2023-01-30

## 2023-01-30 ASSESSMENT — ENCOUNTER SYMPTOMS: ABDOMINAL PAIN: 0

## 2023-01-30 NOTE — PROGRESS NOTES
Subjective:      Patient ID: Jeana Jeong is a 60 y.o. male    HPI  This is a 61 yo male with h/o Uric Acid stones s/p ESWL in past (2010), BPH on Flomax/Ditropan and back in follow-up. Since last seen on 12/21/21 for a stable and likely benign renal cysts, he has no new  complaints. He continues on Urocit k 10 meq po BID and tries to take TID and Allopurinol daily with no SE reported. He has no new medical or surgical problems. He did pass a stone since last seen. I reviewed the interval PSA results. MRI 6/21: 1 cm Rt Bosniak 2 renal cyst and requires no further F/U  CT with IV contrast at [facility] 5/17/21: bilateral renal cysts and non-obstructing stones and 9 mm Rt hyperdense lesion in kidney    No past medical history on file. Past Surgical History:   Procedure Laterality Date    COLONOSCOPY  2018    VEIN SURGERY       Social History     Socioeconomic History    Marital status:      Spouse name: None    Number of children: None    Years of education: None    Highest education level: None   Tobacco Use    Smoking status: Never    Smokeless tobacco: Never   Substance and Sexual Activity    Alcohol use: Yes    Drug use: No     No family history on file.   Current Outpatient Medications   Medication Sig Dispense Refill    potassium citrate (UROCIT-K) 10 MEQ (1080 MG) extended release tablet TAKE ONE TABLET BY MOUTH TWO TIMES A  tablet 3    oxybutynin (DITROPAN XL) 10 MG extended release tablet Take 1 tablet by mouth daily 90 tablet 3    B Complex Vitamins (VITAMIN B COMPLEX) TABS Take 1 tablet by mouth daily      aspirin 81 MG chewable tablet Take 81 mg by mouth daily      metFORMIN (GLUCOPHAGE) 1000 MG tablet TAKE ONE TABLET BY MOUTH TWO TIMES A DAY      oxybutynin (DITROPAN-XL) 10 MG extended release tablet TAKE ONE TABLET BY MOUTH ONCE DAILY (Patient not taking: Reported on 12/21/2021)      allopurinol (ZYLOPRIM) 300 MG tablet Take 1 tablet by mouth daily 90 tablet 3    tamsulosin (FLOMAX) 0.4 MG capsule TAKE ONE CAPSULE BY MOUTH DAILY 90 capsule 3    losartan (COZAAR) 25 MG tablet Take 25 mg by mouth daily      atorvastatin (LIPITOR) 10 MG tablet        No current facility-administered medications for this visit. Patient has no known allergies. reviewed      Review of Systems   Constitutional:  Negative for fever. Gastrointestinal:  Negative for abdominal pain. Genitourinary:  Negative for flank pain and hematuria. Objective:   Physical Exam  Constitutional:       Appearance: Normal appearance. Genitourinary:     Prostate: Enlarged. Not tender and no nodules present. Rectum: No external hemorrhoid. Neurological:      Mental Status: He is alert. PSA   Date Value Ref Range Status   01/28/2023 1.39 0.00 - 4.00 ng/mL Final   12/18/2021 1.00 0.00 - 4.00 ng/mL Final   12/07/2020 1.02 0.00 - 5.40 ng/mL Final   11/16/2019 1.21 0.00 - 5.40 ng/mL Final   10/30/2018 1.16 0.00 - 5.40 ng/mL Final       Assessment: This is a 59 yo male with h/o Uric Acid stones s/p ESWL in past (2010), BPH on Flomax/Ditropan and stable/benign renal cyst (no further F/U needed) with no new voiding complaints. He will continue on Urocit K and Allopurinol.        Plan:      F/U 1 yr for PSA  Orders Placed This Encounter   Procedures    PSA, Diagnostic     Standing Status:   Future     Standing Expiration Date:   1/30/2024     Orders Placed This Encounter   Medications    oxybutynin (DITROPAN XL) 10 MG extended release tablet     Sig: Take 1 tablet by mouth daily     Dispense:  90 tablet     Refill:  3    tamsulosin (FLOMAX) 0.4 MG capsule     Sig: Take 1 capsule by mouth daily     Dispense:  90 capsule     Refill:  3    potassium citrate (UROCIT-K) 10 MEQ (1080 MG) extended release tablet     Sig: Take 1 tablet  two times daily     Dispense:  180 tablet     Refill:  3    allopurinol (ZYLOPRIM) 300 MG tablet     Sig: Take 1 tablet by mouth daily     Dispense:  90 tablet     Refill:  3 Salvatore Meter, MD

## 2024-02-01 ENCOUNTER — TELEPHONE (OUTPATIENT)
Dept: UROLOGY | Age: 64
End: 2024-02-01

## 2024-02-01 DIAGNOSIS — N13.8 BPH WITH OBSTRUCTION/LOWER URINARY TRACT SYMPTOMS: Primary | ICD-10-CM

## 2024-02-01 DIAGNOSIS — N40.1 BPH WITH OBSTRUCTION/LOWER URINARY TRACT SYMPTOMS: Primary | ICD-10-CM

## 2024-02-02 DIAGNOSIS — N40.1 BPH WITH OBSTRUCTION/LOWER URINARY TRACT SYMPTOMS: ICD-10-CM

## 2024-02-02 DIAGNOSIS — N13.8 BPH WITH OBSTRUCTION/LOWER URINARY TRACT SYMPTOMS: ICD-10-CM

## 2024-02-02 LAB — PSA SERPL-MCNC: 1.38 NG/ML (ref 0–4)

## 2024-02-05 ENCOUNTER — OFFICE VISIT (OUTPATIENT)
Dept: UROLOGY | Age: 64
End: 2024-02-05
Payer: COMMERCIAL

## 2024-02-05 VITALS
DIASTOLIC BLOOD PRESSURE: 70 MMHG | SYSTOLIC BLOOD PRESSURE: 132 MMHG | WEIGHT: 248 LBS | HEART RATE: 67 BPM | HEIGHT: 70 IN | BODY MASS INDEX: 35.5 KG/M2

## 2024-02-05 DIAGNOSIS — N40.1 BPH WITH OBSTRUCTION/LOWER URINARY TRACT SYMPTOMS: Primary | ICD-10-CM

## 2024-02-05 DIAGNOSIS — N13.8 BPH WITH OBSTRUCTION/LOWER URINARY TRACT SYMPTOMS: Primary | ICD-10-CM

## 2024-02-05 DIAGNOSIS — Z87.442 HISTORY OF KIDNEY STONES: ICD-10-CM

## 2024-02-05 PROCEDURE — 99213 OFFICE O/P EST LOW 20 MIN: CPT | Performed by: UROLOGY

## 2024-02-05 PROCEDURE — G8427 DOCREV CUR MEDS BY ELIG CLIN: HCPCS | Performed by: UROLOGY

## 2024-02-05 PROCEDURE — G8417 CALC BMI ABV UP PARAM F/U: HCPCS | Performed by: UROLOGY

## 2024-02-05 PROCEDURE — 1036F TOBACCO NON-USER: CPT | Performed by: UROLOGY

## 2024-02-05 PROCEDURE — 3017F COLORECTAL CA SCREEN DOC REV: CPT | Performed by: UROLOGY

## 2024-02-05 PROCEDURE — G8484 FLU IMMUNIZE NO ADMIN: HCPCS | Performed by: UROLOGY

## 2024-02-05 RX ORDER — LOSARTAN POTASSIUM 50 MG/1
50 TABLET ORAL DAILY
COMMUNITY
Start: 2023-12-10

## 2024-02-05 RX ORDER — OXYBUTYNIN CHLORIDE 10 MG/1
10 TABLET, EXTENDED RELEASE ORAL DAILY
Qty: 90 TABLET | Refills: 3 | Status: SHIPPED | OUTPATIENT
Start: 2024-02-05

## 2024-02-05 RX ORDER — POTASSIUM CITRATE 10 MEQ/1
TABLET, EXTENDED RELEASE ORAL
Qty: 180 TABLET | Refills: 3 | Status: SHIPPED | OUTPATIENT
Start: 2024-02-05

## 2024-02-05 RX ORDER — TAMSULOSIN HYDROCHLORIDE 0.4 MG/1
0.4 CAPSULE ORAL DAILY
Qty: 90 CAPSULE | Refills: 3 | Status: SHIPPED | OUTPATIENT
Start: 2024-02-05

## 2024-02-05 RX ORDER — GABAPENTIN 300 MG/1
CAPSULE ORAL
COMMUNITY
Start: 2023-11-29

## 2024-02-05 ASSESSMENT — ENCOUNTER SYMPTOMS: ABDOMINAL PAIN: 0

## 2024-02-05 NOTE — PROGRESS NOTES
Chaperone for Intimate Exam    1. Was chaperone offered as part of the rooming process? offered, declined   2. If Chaperone is declined by patient, NA: chaperone was available and exam completed  3. Chaperone is N/A

## 2024-02-05 NOTE — PROGRESS NOTES
Subjective:      Patient ID: Blake Murphy is a 63 y.o. male    HPI  This is a 62 yo male with h/o Uric Acid stones s/p ESWL in past (2010), BPH on Flomax/Ditropan and back in follow-up. Since last seen on 1/30/23, he did pass two stones recently and the pain has resolved. He has no current hematuria or problems voiding. He continues on Urocit k 10 meq po BID and tries to take TID and Allopurinol daily with no SE reported. He has no new medical or surgical problems. I reviewed the interval PSA results.     MRI 6/21: 1 cm Rt Bosniak 2 renal cyst and requires no further F/U  CT with IV contrast at [facility] 5/17/21: bilateral renal cysts and non-obstructing stones and 9 mm Rt hyperdense lesion in kidney    No past medical history on file.  Past Surgical History:   Procedure Laterality Date    COLONOSCOPY  2018    COLONOSCOPY  2023    VEIN SURGERY       Social History     Socioeconomic History    Marital status:      Spouse name: None    Number of children: None    Years of education: None    Highest education level: None   Tobacco Use    Smoking status: Never    Smokeless tobacco: Never   Substance and Sexual Activity    Alcohol use: Yes    Drug use: No     No family history on file.  Current Outpatient Medications   Medication Sig Dispense Refill    gabapentin (NEURONTIN) 300 MG capsule TAKE TWO CAPSULES BY MOUTH EVERY DAY at bedtime      losartan (COZAAR) 50 MG tablet Take 1 tablet by mouth daily      oxyBUTYnin (DITROPAN XL) 10 MG extended release tablet Take 1 tablet by mouth daily 90 tablet 3    tamsulosin (FLOMAX) 0.4 MG capsule Take 1 capsule by mouth daily 90 capsule 3    potassium citrate (UROCIT-K) 10 MEQ (1080 MG) extended release tablet Take 1 tablet   tablet 3    oxybutynin (DITROPAN XL) 10 MG extended release tablet Take 1 tablet by mouth daily 90 tablet 3    tamsulosin (FLOMAX) 0.4 MG capsule Take 1 capsule by mouth daily 90 capsule 3    potassium citrate (UROCIT-K) 10 MEQ (1080 MG) extended

## 2025-02-04 DIAGNOSIS — N13.8 BPH WITH OBSTRUCTION/LOWER URINARY TRACT SYMPTOMS: ICD-10-CM

## 2025-02-04 DIAGNOSIS — N40.1 BPH WITH OBSTRUCTION/LOWER URINARY TRACT SYMPTOMS: ICD-10-CM

## 2025-02-04 LAB — PSA SERPL-MCNC: 1.28 NG/ML (ref 0–4)

## 2025-02-05 ENCOUNTER — OFFICE VISIT (OUTPATIENT)
Dept: UROLOGY | Age: 65
End: 2025-02-05
Payer: COMMERCIAL

## 2025-02-05 VITALS
HEIGHT: 70 IN | WEIGHT: 245 LBS | BODY MASS INDEX: 35.07 KG/M2 | HEART RATE: 87 BPM | DIASTOLIC BLOOD PRESSURE: 70 MMHG | SYSTOLIC BLOOD PRESSURE: 114 MMHG

## 2025-02-05 DIAGNOSIS — N13.8 BPH WITH OBSTRUCTION/LOWER URINARY TRACT SYMPTOMS: ICD-10-CM

## 2025-02-05 DIAGNOSIS — Z87.442 HISTORY OF KIDNEY STONES: ICD-10-CM

## 2025-02-05 DIAGNOSIS — N40.1 BPH WITH LOWER URINARY TRACT SYMPTOMS WITHOUT URINARY OBSTRUCTION: Primary | ICD-10-CM

## 2025-02-05 DIAGNOSIS — N40.1 BPH WITH OBSTRUCTION/LOWER URINARY TRACT SYMPTOMS: ICD-10-CM

## 2025-02-05 PROCEDURE — 1123F ACP DISCUSS/DSCN MKR DOCD: CPT | Performed by: UROLOGY

## 2025-02-05 PROCEDURE — 99213 OFFICE O/P EST LOW 20 MIN: CPT | Performed by: UROLOGY

## 2025-02-05 PROCEDURE — 1036F TOBACCO NON-USER: CPT | Performed by: UROLOGY

## 2025-02-05 PROCEDURE — 3017F COLORECTAL CA SCREEN DOC REV: CPT | Performed by: UROLOGY

## 2025-02-05 PROCEDURE — G8417 CALC BMI ABV UP PARAM F/U: HCPCS | Performed by: UROLOGY

## 2025-02-05 PROCEDURE — G8427 DOCREV CUR MEDS BY ELIG CLIN: HCPCS | Performed by: UROLOGY

## 2025-02-05 RX ORDER — SILDENAFIL 50 MG/1
TABLET, FILM COATED ORAL
COMMUNITY
Start: 2025-01-11

## 2025-02-05 RX ORDER — PREGABALIN 150 MG/1
150 CAPSULE ORAL 2 TIMES DAILY
COMMUNITY
Start: 2024-10-22 | End: 2025-04-20

## 2025-02-05 RX ORDER — POTASSIUM CITRATE 10 MEQ/1
TABLET, EXTENDED RELEASE ORAL
Qty: 180 TABLET | Refills: 3 | Status: SHIPPED | OUTPATIENT
Start: 2025-02-05

## 2025-02-05 RX ORDER — OXYBUTYNIN CHLORIDE 10 MG/1
10 TABLET, EXTENDED RELEASE ORAL DAILY
Qty: 90 TABLET | Refills: 3 | Status: SHIPPED | OUTPATIENT
Start: 2025-02-05

## 2025-02-05 RX ORDER — TAMSULOSIN HYDROCHLORIDE 0.4 MG/1
0.4 CAPSULE ORAL DAILY
Qty: 90 CAPSULE | Refills: 3 | Status: SHIPPED | OUTPATIENT
Start: 2025-02-05

## 2025-02-05 ASSESSMENT — ENCOUNTER SYMPTOMS
ABDOMINAL PAIN: 0
ABDOMINAL DISTENTION: 0

## 2025-02-05 NOTE — PROGRESS NOTES
Subjective:      Patient ID: Blake Murphy is a 64 y.o. male    HPI  This is a 65 yo male with h/o Uric Acid stones s/p ESWL in past (2010), BPH on Flomax/Ditropan and back in follow-up. Since last seen on 2/5/24, he has not had any interval colic or new voiding complaints. He gets occasional frequency.  He continues on Urocit k 10 meq po BID and tries to take TID and Allopurinol daily with no SE reported. He has no new medical or surgical problems. I reviewed the interval PSA results.     MRI 6/21: 1 cm Rt Bosniak 2 renal cyst and requires no further F/U  CT with IV contrast at [facility] 5/17/21: bilateral renal cysts and non-obstructing stones and 9 mm Rt hyperdense lesion in kidney    History reviewed. No pertinent past medical history.  Past Surgical History:   Procedure Laterality Date    COLONOSCOPY  2018    COLONOSCOPY  2023    VEIN SURGERY       Social History     Socioeconomic History    Marital status:      Spouse name: None    Number of children: None    Years of education: None    Highest education level: None   Tobacco Use    Smoking status: Never    Smokeless tobacco: Never   Substance and Sexual Activity    Alcohol use: Yes    Drug use: No     History reviewed. No pertinent family history.  Current Outpatient Medications   Medication Sig Dispense Refill    gabapentin (NEURONTIN) 300 MG capsule TAKE TWO CAPSULES BY MOUTH EVERY DAY at bedtime      losartan (COZAAR) 50 MG tablet Take 1 tablet by mouth daily      oxyBUTYnin (DITROPAN XL) 10 MG extended release tablet Take 1 tablet by mouth daily 90 tablet 3    tamsulosin (FLOMAX) 0.4 MG capsule Take 1 capsule by mouth daily 90 capsule 3    potassium citrate (UROCIT-K) 10 MEQ (1080 MG) extended release tablet Take 1 tablet   tablet 3    oxybutynin (DITROPAN XL) 10 MG extended release tablet Take 1 tablet by mouth daily 90 tablet 3    tamsulosin (FLOMAX) 0.4 MG capsule Take 1 capsule by mouth daily 90 capsule 3    potassium citrate (UROCIT-K) 10 MEQ